# Patient Record
Sex: MALE | Race: WHITE | NOT HISPANIC OR LATINO | Employment: OTHER | URBAN - METROPOLITAN AREA
[De-identification: names, ages, dates, MRNs, and addresses within clinical notes are randomized per-mention and may not be internally consistent; named-entity substitution may affect disease eponyms.]

---

## 2018-01-11 NOTE — MISCELLANEOUS
Message  i left message on pt's answering machine regarding his pet-scan if it was done since he is coming to see [facility] tomorrow to get his results  Active Problems    1  Lung mass (786 6) (R91 8)   2   Type 2 diabetes mellitus with hyperglycemia (250 00) (E11 65)    Signatures   Electronically signed by : HIPOLITO Bailey ; Mar 17 2016 11:24AM EST                       (Acknowledgement)

## 2018-01-12 NOTE — MISCELLANEOUS
Dear Mara Lane were last seen by me in the hospital, during which time a lung mass was found  We attempted to set you up for an outpatient PET scan but this was canceled because your blood sugar was too high  My office  has made multiple attempts to contact you to reschedule this  We have not received an answer or a return phone call  Clearly the concern with the lung mass is that this could represent a primary lung cancer and the finding needs follow-up  In addition,  you also had an appointment scheduled today, 3/17/16 at the [facility] office  You failed to arrive for this visit  At this point, I will presume that you do not wish to have any further follow-up of this lung mass  If this is not correct, please  reach out to my office immediately to reschedule an appointment  Sincerely,        Raquel Thomas MD, [facility]          Electronically signed by:Jasper MILLER    Mar 17 2016 11:22AM EST Author

## 2018-01-12 NOTE — MISCELLANEOUS
Message  I left a message on patients voice mail to call our office regarding seeing a pcp for his high blood sugar and that he still needs to reschedule his PET scan  Active Problems    1  Lung mass (786 6) (R91 8)   2   Type 2 diabetes mellitus with hyperglycemia (250 00) (E11 65)    Signatures   Electronically signed by : HIPOLITO Coker ; Mar 17 2016 11:24AM EST                       (Acknowledgement)

## 2018-01-13 NOTE — MISCELLANEOUS
Message  Called patients home his wife answered I asked to speak to Erismitchell Bent she said he was at work  I asked her if she would have him call me back and she took our number for him to return my call  Patient is to be rescheduled for a PET (his blood sugar was too high) scan with follow up appointment that is scheduled for 3/17/16 @ 10:40am      Active Problems    1  Lung mass (786 6) (R91 8)   2   Type 2 diabetes mellitus with hyperglycemia (250 00) (E11 65)    Signatures   Electronically signed by : HIPOLITO Vivar ; Mar 17 2016 11:23AM EST                       (Acknowledgement)

## 2018-01-14 NOTE — MISCELLANEOUS
Message  Jaxon Wallace from PET scan Dept called re Nain Arango  She said that Mr Sacha Beltre is not a known diabetic, had done the prep and was in the department for his PET scan  according to jenna, his FBS was 260  Sh was reluctant to let him leave, but he had fired his PCP  I was able to communicate with Dr Javed Hammond, who recommended that the patient be sent to the Urgent Care at [facility]  I gave that information to Jaxon Wallace  Active Problems    1  Lung mass (786 6) (R91 8)   2   Type 2 diabetes mellitus with hyperglycemia (250 00) (E11 65)    Signatures   Electronically signed by : Joel Uriarte, ; Feb 19 2016 12:53PM EST                       (Author)

## 2018-01-16 NOTE — MISCELLANEOUS
Merlene Palacios is scheduled for a PET scan on 2/19/16 at [facility] Dr Adam Hawkins requested this  The auth# is [id number] good from 2/12/2016-4/12/2016  Active Problems    1  Lung mass (786 6) (R91 8)   2   Type 2 diabetes mellitus with hyperglycemia (250 00) (E11 65)    Signatures   Electronically signed by : HIPOLITO Kelsey ; Mar 17 2016 11:23AM EST                       (Acknowledgement)

## 2018-01-17 NOTE — MISCELLANEOUS
Provider Comments  Provider Comments:   pt was a no showfor todays appt   LVM for pt tocall back to reschedule      Signatures   Electronically signed by : HIPOLITO Hdz ; Mar 17 2016 11:24AM EST                       (Acknowledgement)

## 2019-05-21 ENCOUNTER — HOSPITAL ENCOUNTER (EMERGENCY)
Facility: HOSPITAL | Age: 74
Discharge: HOME | End: 2019-05-21
Attending: EMERGENCY MEDICINE | Admitting: EMERGENCY MEDICINE
Payer: COMMERCIAL

## 2019-05-21 ENCOUNTER — APPOINTMENT (EMERGENCY)
Dept: CT IMAGING | Facility: HOSPITAL | Age: 74
End: 2019-05-21
Payer: COMMERCIAL

## 2019-05-21 VITALS
SYSTOLIC BLOOD PRESSURE: 180 MMHG | RESPIRATION RATE: 20 BRPM | TEMPERATURE: 99.1 F | DIASTOLIC BLOOD PRESSURE: 91 MMHG | HEART RATE: 90 BPM | OXYGEN SATURATION: 97 % | WEIGHT: 247.8 LBS | BODY MASS INDEX: 38.81 KG/M2

## 2019-05-21 DIAGNOSIS — S01.91XA LACERATION OF HEAD: ICD-10-CM
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
## 2019-05-21 DIAGNOSIS — N39.0 URINARY TRACT INFECTION: Primary | ICD-10-CM

## 2019-05-21 LAB
ALBUMIN SERPL BCP-MCNC: 4.2 G/DL (ref 3–5.2)
ALP SERPL-CCNC: 61 U/L (ref 43–122)
ALT SERPL W P-5'-P-CCNC: 15 U/L (ref 9–52)
ANION GAP SERPL CALCULATED.3IONS-SCNC: 8 MMOL/L (ref 5–14)
ANISOCYTOSIS BLD QL SMEAR: PRESENT
APTT PPP: 25 SECONDS (ref 23–34)
AST SERPL W P-5'-P-CCNC: 17 U/L (ref 17–59)
BACTERIA UR QL AUTO: ABNORMAL /HPF
BASOPHILS # BLD AUTO: 0.1 THOUSANDS/ΜL (ref 0–0.1)
BASOPHILS NFR BLD AUTO: 1 % (ref 0–1)
BILIRUB SERPL-MCNC: 1.4 MG/DL
BILIRUB UR QL STRIP: NEGATIVE
BUN SERPL-MCNC: 18 MG/DL (ref 5–25)
CALCIUM SERPL-MCNC: 9.2 MG/DL (ref 8.4–10.2)
CHLORIDE SERPL-SCNC: 107 MMOL/L (ref 97–108)
CLARITY UR: ABNORMAL
CO2 SERPL-SCNC: 27 MMOL/L (ref 22–30)
COLOR UR: YELLOW
CREAT SERPL-MCNC: 0.73 MG/DL (ref 0.7–1.5)
DACRYOCYTES BLD QL SMEAR: PRESENT
EOSINOPHIL # BLD AUTO: 0.1 THOUSAND/ΜL (ref 0–0.4)
EOSINOPHIL NFR BLD AUTO: 1 % (ref 0–6)
ERYTHROCYTE [DISTWIDTH] IN BLOOD BY AUTOMATED COUNT: 25 %
GFR SERPL CREATININE-BSD FRML MDRD: 93 ML/MIN/1.73SQ M
GLUCOSE SERPL-MCNC: 129 MG/DL (ref 70–99)
GLUCOSE UR STRIP-MCNC: NEGATIVE MG/DL
HCT VFR BLD AUTO: 36.2 % (ref 41–53)
HGB BLD-MCNC: 11.7 G/DL (ref 13.5–17.5)
HGB UR QL STRIP.AUTO: 50
INR PPP: 1.02 (ref 0.89–1.1)
KETONES UR STRIP-MCNC: ABNORMAL MG/DL
LEUKOCYTE ESTERASE UR QL STRIP: 500
LYMPHOCYTES # BLD AUTO: 1 THOUSANDS/ΜL (ref 0.5–4)
LYMPHOCYTES NFR BLD AUTO: 12 % (ref 25–45)
MAGNESIUM SERPL-MCNC: 1.6 MG/DL (ref 1.6–2.3)
MCH RBC QN AUTO: 30 PG (ref 26–34)
MCHC RBC AUTO-ENTMCNC: 32.4 G/DL (ref 31–36)
MCV RBC AUTO: 93 FL (ref 80–100)
MONOCYTES # BLD AUTO: 0.4 THOUSAND/ΜL (ref 0.2–0.9)
MONOCYTES NFR BLD AUTO: 5 % (ref 1–10)
NEUTROPHILS # BLD AUTO: 6.5 THOUSANDS/ΜL (ref 1.8–7.8)
NEUTS SEG NFR BLD AUTO: 81 % (ref 45–65)
NITRITE UR QL STRIP: NEGATIVE
NON-SQ EPI CELLS URNS QL MICRO: ABNORMAL /HPF
OVALOCYTES BLD QL SMEAR: PRESENT
PH UR STRIP.AUTO: 6.5 [PH]
PLATELET # BLD AUTO: 114 THOUSANDS/UL (ref 150–450)
PLATELET BLD QL SMEAR: ABNORMAL
PMV BLD AUTO: 7.5 FL (ref 8.9–12.7)
POIKILOCYTOSIS BLD QL SMEAR: PRESENT
POTASSIUM SERPL-SCNC: 3.8 MMOL/L (ref 3.6–5)
PROT SERPL-MCNC: 6.9 G/DL (ref 5.9–8.4)
PROT UR STRIP-MCNC: ABNORMAL MG/DL
PROTHROMBIN TIME: 10.8 SECONDS (ref 9.5–11.6)
RBC # BLD AUTO: 3.91 MILLION/UL (ref 4.5–5.9)
RBC #/AREA URNS AUTO: ABNORMAL /HPF
RBC MORPH BLD: PRESENT
SODIUM SERPL-SCNC: 142 MMOL/L (ref 137–147)
SP GR UR STRIP.AUTO: 1.01 (ref 1–1.04)
TROPONIN I SERPL-MCNC: <0.01 NG/ML (ref 0–0.03)
UROBILINOGEN UA: NEGATIVE MG/DL
WBC # BLD AUTO: 8.1 THOUSAND/UL (ref 4.5–11)
WBC #/AREA URNS AUTO: ABNORMAL /HPF

## 2019-05-21 PROCEDURE — 81001 URINALYSIS AUTO W/SCOPE: CPT | Performed by: PHYSICIAN ASSISTANT

## 2019-05-21 PROCEDURE — 81003 URINALYSIS AUTO W/O SCOPE: CPT | Performed by: PHYSICIAN ASSISTANT

## 2019-05-21 PROCEDURE — 80053 COMPREHEN METABOLIC PANEL: CPT | Performed by: PHYSICIAN ASSISTANT

## 2019-05-21 PROCEDURE — 36415 COLL VENOUS BLD VENIPUNCTURE: CPT | Performed by: PHYSICIAN ASSISTANT

## 2019-05-21 PROCEDURE — 96360 HYDRATION IV INFUSION INIT: CPT

## 2019-05-21 PROCEDURE — 87147 CULTURE TYPE IMMUNOLOGIC: CPT | Performed by: PHYSICIAN ASSISTANT

## 2019-05-21 PROCEDURE — 99284 EMERGENCY DEPT VISIT MOD MDM: CPT | Performed by: PHYSICIAN ASSISTANT

## 2019-05-21 PROCEDURE — 70450 CT HEAD/BRAIN W/O DYE: CPT

## 2019-05-21 PROCEDURE — 87086 URINE CULTURE/COLONY COUNT: CPT | Performed by: PHYSICIAN ASSISTANT

## 2019-05-21 PROCEDURE — 87186 SC STD MICRODIL/AGAR DIL: CPT | Performed by: PHYSICIAN ASSISTANT

## 2019-05-21 PROCEDURE — 83735 ASSAY OF MAGNESIUM: CPT | Performed by: PHYSICIAN ASSISTANT

## 2019-05-21 PROCEDURE — 72125 CT NECK SPINE W/O DYE: CPT

## 2019-05-21 PROCEDURE — 12001 RPR S/N/AX/GEN/TRNK 2.5CM/<: CPT | Performed by: PHYSICIAN ASSISTANT

## 2019-05-21 PROCEDURE — 87077 CULTURE AEROBIC IDENTIFY: CPT | Performed by: PHYSICIAN ASSISTANT

## 2019-05-21 PROCEDURE — 85025 COMPLETE CBC W/AUTO DIFF WBC: CPT | Performed by: PHYSICIAN ASSISTANT

## 2019-05-21 PROCEDURE — 85730 THROMBOPLASTIN TIME PARTIAL: CPT | Performed by: PHYSICIAN ASSISTANT

## 2019-05-21 PROCEDURE — 99284 EMERGENCY DEPT VISIT MOD MDM: CPT

## 2019-05-21 PROCEDURE — 96361 HYDRATE IV INFUSION ADD-ON: CPT

## 2019-05-21 PROCEDURE — 85610 PROTHROMBIN TIME: CPT | Performed by: PHYSICIAN ASSISTANT

## 2019-05-21 PROCEDURE — 93005 ELECTROCARDIOGRAM TRACING: CPT

## 2019-05-21 PROCEDURE — 84484 ASSAY OF TROPONIN QUANT: CPT | Performed by: PHYSICIAN ASSISTANT

## 2019-05-21 RX ORDER — LOSARTAN POTASSIUM 100 MG/1
TABLET ORAL
COMMUNITY
Start: 2019-04-15

## 2019-05-21 RX ORDER — CEPHALEXIN 500 MG/1
500 CAPSULE ORAL 4 TIMES DAILY
Qty: 28 CAPSULE | Refills: 0 | Status: SHIPPED | OUTPATIENT
Start: 2019-05-21 | End: 2019-05-28

## 2019-05-21 RX ORDER — TAMSULOSIN HYDROCHLORIDE 0.4 MG/1
0.4 CAPSULE ORAL
COMMUNITY
Start: 2019-05-15

## 2019-05-21 RX ORDER — ASCORBIC ACID 500 MG
1000 TABLET ORAL DAILY
COMMUNITY

## 2019-05-21 RX ORDER — DIPHENOXYLATE HYDROCHLORIDE AND ATROPINE SULFATE 2.5; .025 MG/1; MG/1
1 TABLET ORAL DAILY
COMMUNITY

## 2019-05-21 RX ADMIN — SODIUM CHLORIDE 1000 ML: 9 INJECTION, SOLUTION INTRAVENOUS at 17:12

## 2019-05-22 LAB
ATRIAL RATE: 84 BPM
P AXIS: 47 DEGREES
PR INTERVAL: 156 MS
QRS AXIS: -76 DEGREES
QRSD INTERVAL: 130 MS
QT INTERVAL: 396 MS
QTC INTERVAL: 467 MS
T WAVE AXIS: 42 DEGREES
VENTRICULAR RATE: 84 BPM

## 2019-05-22 PROCEDURE — 93010 ELECTROCARDIOGRAM REPORT: CPT | Performed by: INTERNAL MEDICINE

## 2019-05-23 LAB — BACTERIA UR CULT: ABNORMAL
